# Patient Record
Sex: FEMALE | Race: BLACK OR AFRICAN AMERICAN | NOT HISPANIC OR LATINO | Employment: FULL TIME | URBAN - METROPOLITAN AREA
[De-identification: names, ages, dates, MRNs, and addresses within clinical notes are randomized per-mention and may not be internally consistent; named-entity substitution may affect disease eponyms.]

---

## 2020-01-22 ENCOUNTER — TELEPHONE (OUTPATIENT)
Dept: OPHTHALMOLOGY | Facility: CLINIC | Age: 30
End: 2020-01-22

## 2022-11-05 PROBLEM — E04.2 MULTIPLE THYROID NODULES: Status: ACTIVE | Noted: 2022-11-05

## 2024-06-19 PROBLEM — F31.9 BIPOLAR DISEASE, CHRONIC: Status: ACTIVE | Noted: 2024-06-19

## 2024-06-19 PROBLEM — F32.1 CURRENT MODERATE EPISODE OF MAJOR DEPRESSIVE DISORDER WITHOUT PRIOR EPISODE: Status: ACTIVE | Noted: 2024-06-19

## 2024-06-22 PROBLEM — E55.9 VITAMIN D DEFICIENCY: Status: ACTIVE | Noted: 2024-06-22

## 2024-11-25 ENCOUNTER — OFFICE VISIT (OUTPATIENT)
Dept: ALLERGY | Facility: CLINIC | Age: 34
End: 2024-11-25
Payer: COMMERCIAL

## 2024-11-25 ENCOUNTER — PATIENT MESSAGE (OUTPATIENT)
Dept: ALLERGY | Facility: CLINIC | Age: 34
End: 2024-11-25

## 2024-11-25 ENCOUNTER — LAB VISIT (OUTPATIENT)
Dept: LAB | Facility: HOSPITAL | Age: 34
End: 2024-11-25
Attending: ALLERGY & IMMUNOLOGY
Payer: COMMERCIAL

## 2024-11-25 VITALS
HEART RATE: 70 BPM | WEIGHT: 160.25 LBS | TEMPERATURE: 98 F | DIASTOLIC BLOOD PRESSURE: 78 MMHG | BODY MASS INDEX: 29.31 KG/M2 | SYSTOLIC BLOOD PRESSURE: 118 MMHG

## 2024-11-25 DIAGNOSIS — J30.89 SEASONAL ALLERGIC RHINITIS DUE TO OTHER ALLERGIC TRIGGER: Primary | ICD-10-CM

## 2024-11-25 DIAGNOSIS — L50.1 CHRONIC IDIOPATHIC URTICARIA: ICD-10-CM

## 2024-11-25 DIAGNOSIS — J30.89 SEASONAL ALLERGIC RHINITIS DUE TO OTHER ALLERGIC TRIGGER: ICD-10-CM

## 2024-11-25 DIAGNOSIS — R06.83 SNORING: ICD-10-CM

## 2024-11-25 DIAGNOSIS — J45.40 MODERATE PERSISTENT ASTHMA WITHOUT COMPLICATION: ICD-10-CM

## 2024-11-25 PROCEDURE — 99205 OFFICE O/P NEW HI 60 MIN: CPT | Mod: S$GLB,,, | Performed by: ALLERGY & IMMUNOLOGY

## 2024-11-25 PROCEDURE — 3074F SYST BP LT 130 MM HG: CPT | Mod: CPTII,S$GLB,, | Performed by: ALLERGY & IMMUNOLOGY

## 2024-11-25 PROCEDURE — 86800 THYROGLOBULIN ANTIBODY: CPT | Performed by: ALLERGY & IMMUNOLOGY

## 2024-11-25 PROCEDURE — 3078F DIAST BP <80 MM HG: CPT | Mod: CPTII,S$GLB,, | Performed by: ALLERGY & IMMUNOLOGY

## 2024-11-25 PROCEDURE — 99999 PR PBB SHADOW E&M-EST. PATIENT-LVL III: CPT | Mod: PBBFAC,,, | Performed by: ALLERGY & IMMUNOLOGY

## 2024-11-25 PROCEDURE — 83520 IMMUNOASSAY QUANT NOS NONAB: CPT | Performed by: ALLERGY & IMMUNOLOGY

## 2024-11-25 PROCEDURE — 86003 ALLG SPEC IGE CRUDE XTRC EA: CPT | Performed by: ALLERGY & IMMUNOLOGY

## 2024-11-25 PROCEDURE — 1159F MED LIST DOCD IN RCRD: CPT | Mod: CPTII,S$GLB,, | Performed by: ALLERGY & IMMUNOLOGY

## 2024-11-25 PROCEDURE — 3008F BODY MASS INDEX DOCD: CPT | Mod: CPTII,S$GLB,, | Performed by: ALLERGY & IMMUNOLOGY

## 2024-11-25 PROCEDURE — 3044F HG A1C LEVEL LT 7.0%: CPT | Mod: CPTII,S$GLB,, | Performed by: ALLERGY & IMMUNOLOGY

## 2024-11-25 PROCEDURE — 36415 COLL VENOUS BLD VENIPUNCTURE: CPT | Performed by: ALLERGY & IMMUNOLOGY

## 2024-11-25 PROCEDURE — 88184 FLOWCYTOMETRY/ TC 1 MARKER: CPT | Performed by: ALLERGY & IMMUNOLOGY

## 2024-11-25 RX ORDER — LEVOCETIRIZINE DIHYDROCHLORIDE 5 MG/1
5 TABLET, FILM COATED ORAL 2 TIMES DAILY
Qty: 180 TABLET | Refills: 4 | Status: SHIPPED | OUTPATIENT
Start: 2024-11-25 | End: 2025-11-25

## 2024-11-25 RX ORDER — CETIRIZINE HYDROCHLORIDE 10 MG/1
10 TABLET ORAL DAILY
COMMUNITY

## 2024-11-25 RX ORDER — FLUTICASONE PROPIONATE AND SALMETEROL 250; 50 UG/1; UG/1
1 POWDER RESPIRATORY (INHALATION) 2 TIMES DAILY
Qty: 1 EACH | Refills: 5 | Status: SHIPPED | OUTPATIENT
Start: 2024-11-25 | End: 2025-11-25

## 2024-11-25 NOTE — PROGRESS NOTES
"Subjective:      Patient ID: Morro Prieto is a 34 y.o. female.      Referred by Agustina Lazo MD    Chief Complaint:  Allergies      HPI: 11/25/2024: 34 year old female-  - runny nose, nasal congestion- year round  - steroid twice for symptoms  -Zyrtec and Singulair- "typically work, but if I miss a day, I sneeze all day long."  - itchy nose,   - hives until she started taking Singulair and Zyrtec daily  - hives started after antidepressants        Prescribed albuterol inhaler- last used a month ago  Triggers- unsure  Steroids- a month ago    She reports that Flonase does not work.    She reports symptoms started after pregnancy- delivery August 2022.  History of thyroid issues- not on medications. Seen by Endocrinology.        Past Medical History:   Diagnosis Date    ADD (attention deficit disorder) without hyperactivity     Allergic rhinitis     Anxiety     Gestational HTN         Family History   Problem Relation Name Age of Onset    Ovarian cancer Other MGGM     Hypertension Other      Endometrial cancer Maternal Grandmother Oswaldo Mullins     Ovarian cancer Maternal Grandmother Oswaldo Mullins     Cervical cancer Maternal Grandmother Owsaldo Cline (Justa Mother)    Migraines Mother Drew Mullins         Current Outpatient Medications on File Prior to Visit   Medication Sig Dispense Refill    azelastine (ASTELIN) 137 mcg (0.1 %) nasal spray 1 spray (137 mcg total) by Nasal route 2 (two) times daily. 30 mL 5    budesonide-formoterol 160-4.5 mcg (SYMBICORT) 160-4.5 mcg/actuation HFAA INHALE 2 PUFFS INTO THE LUNGS EVERY 12 HOURS 90 g 1    buPROPion (WELLBUTRIN XL) 150 MG TB24 tablet One tab po qam x 1 week then increase to two tabs  po qam 60 tablet 2    cetirizine (ZYRTEC) 10 MG tablet Take 10 mg by mouth once daily.      montelukast (SINGULAIR) 10 mg tablet Take 1 tablet (10 mg total) by mouth every evening. 90 tablet 3    dextroamphetamine-amphetamine (ADDERALL XR) 25 MG 24 hr " capsule Take 1 capsule (25 mg total) by mouth every morning. 30 capsule 0    predniSONE (DELTASONE) 20 MG tablet Take 1 tablet (20 mg total) by mouth once daily. (Patient not taking: Reported on 11/25/2024) 5 tablet 0     No current facility-administered medications on file prior to visit.        Review of patient's allergies indicates:  No Known Allergies     Environmental History: Pets in the home: none.  Tobacco Smoke in Home: no She Is opening a . Previously worked for [employer].  Review of Systems   Constitutional:  Negative for chills and fever.   HENT:  Positive for congestion and rhinorrhea.    Eyes:  Positive for discharge and itching.   Respiratory:  Negative for cough, shortness of breath and wheezing.    Skin:  Positive for rash.   Allergic/Immunologic: Positive for environmental allergies. Negative for food allergies and immunocompromised state.   Neurological:  Negative for facial asymmetry and speech difficulty.       Objective:   Physical Exam  Vitals reviewed.   Constitutional:       General: She is not in acute distress.     Appearance: Normal appearance. She is well-developed. She is not ill-appearing, toxic-appearing or diaphoretic.   HENT:      Head: Normocephalic and atraumatic.      Right Ear: Tympanic membrane, ear canal and external ear normal. There is no impacted cerumen.      Left Ear: Tympanic membrane, ear canal and external ear normal. There is no impacted cerumen.      Nose: Nose normal. No congestion or rhinorrhea.      Mouth/Throat:      Pharynx: No oropharyngeal exudate or posterior oropharyngeal erythema.   Eyes:      General: No scleral icterus.        Right eye: No discharge.         Left eye: No discharge.      Pupils: Pupils are equal, round, and reactive to light.   Neck:      Thyroid: No thyromegaly.   Cardiovascular:      Rate and Rhythm: Normal rate and regular rhythm.      Heart sounds: Normal heart sounds. No murmur heard.     No friction rub. No gallop.   Pulmonary:       Effort: Pulmonary effort is normal. No respiratory distress.      Breath sounds: Normal breath sounds. No stridor. No wheezing, rhonchi or rales.   Chest:      Chest wall: No tenderness.   Musculoskeletal:         General: No swelling, tenderness or deformity. Normal range of motion.      Cervical back: Normal range of motion and neck supple. No rigidity or tenderness. No muscular tenderness.   Lymphadenopathy:      Cervical: No cervical adenopathy.   Skin:     General: Skin is warm.      Coloration: Skin is not jaundiced or pale.      Findings: No bruising, erythema or rash.   Neurological:      General: No focal deficit present.      Mental Status: She is alert and oriented to person, place, and time.      Gait: Gait normal.   Psychiatric:         Mood and Affect: Mood normal.         Behavior: Behavior normal.         Thought Content: Thought content normal.         Judgment: Judgment normal.           Assessment:      1. Seasonal allergic rhinitis due to other allergic trigger    2. Snoring    3. Chronic idiopathic urticaria    4. Moderate persistent asthma without complication        Plan:       Seasonal allergic rhinitis due to other allergic trigger  -     Ambulatory referral/consult to Allergy  -     Allergen Profile, Zone 6; Future; Expected date: 11/25/2024  -     levocetirizine (XYZAL) 5 MG tablet; Take 1 tablet (5 mg total) by mouth 2 (two) times a day.  Dispense: 180 tablet; Refill: 4    Snoring  -     Home Sleep Study; Future    Chronic idiopathic urticaria  -     Tryptase; Future; Expected date: 11/25/2024  -     CU (Chronic Urticaria) Index Panel; Future; Expected date: 11/25/2024  -     FUNC ANTI-FC R AUTOANTIBODY; Future; Expected date: 11/25/2024    Moderate persistent asthma without complication  -     fluticasone-salmeterol diskus inhaler 250-50 mcg; Inhale 1 puff into the lungs 2 (two) times daily. Controller  Dispense: 1 each; Refill: 5  -     Spirometry with/without bronchodilator;  Future     Continue current medications. Xyzal to replace Zyrtec. Advised that it can be sedating.   Labs ordered. Unable to skin prick test, as she is taking antihistamines.   Home sleep study ordered due to snoring and daytime somnolence.  Spirometry ordered.  Starting Advair- explained administration    RTC 2-4 weeks     RAFAEL ALBA spent a total of 62 minutes on the day of the visit.This includes face to face time and non-face to face time preparing to see the patient (eg, review of tests), obtaining and/or reviewing separately obtained history, documenting clinical information in the electronic or other health record, independently interpreting results and communicating results to the patient/family/caregiver, or care coordinator.

## 2024-11-26 LAB — TRYPTASE LEVEL: 2.6 NG/ML

## 2024-11-29 LAB
A ALTERNATA IGE QN: <0.1 KU/L
A FUMIGATUS IGE QN: <0.1 KU/L
ALLERGEN BOXELDER MAPLE TREE IGE: 0.3 KU/L
ALLERGEN MULBERRY TREE IGE: 0.32 KU/L
ALLERGEN PIGWEED IGE: 0.23 KU/L
ALLERGEN WALNUT TREE IGE: 0.39 KU/L
BERMUDA GRASS IGE QN: 0.4 KU/L
C HERBARUM IGE QN: <0.1 KU/L
CAT DANDER IGE QN: <0.1 KU/L
COMMON RAGWEED IGE QN: 0.84 KU/L
D FARINAE IGE QN: 0.28 KU/L
D PTERONYSS IGE QN: 0.3 KU/L
DEPRECATED TIMOTHY IGE RAST QL: ABNORMAL
DOG DANDER IGE QN: <0.1 KU/L
ELDER IGE QN: 0.25 KU/L
IGE: 429 IU/ML
MOUSE URINE PROT IGE QN: <0.1 KU/L
MT JUNIPER IGE QN: 0.38 KU/L
P NOTATUM IGE QN: 0.15 KU/L
PECAN/HICK TREE IGE QN: 0.24 KU/L
RAST ALLERGEN INTERPRETATION: ABNORMAL
RAST CLASS: ABNORMAL
ROACH IGE QN: 0.11 KU/L
SILVER BIRCH IGE QN: 0.23 KU/L
TIMOTHY IGE QN: 0.42 KU/L
WHITE ELM IGE QN: 0.24 KU/L
WHITE OAK IGE QN: 0.33 KU/L

## 2024-12-04 ENCOUNTER — TELEPHONE (OUTPATIENT)
Dept: SLEEP MEDICINE | Facility: CLINIC | Age: 34
End: 2024-12-04
Payer: COMMERCIAL

## 2024-12-04 NOTE — TELEPHONE ENCOUNTER
----- Message from Maura Parisi sent at 11/26/2024  6:35 AM CST -----  [facility], [facility]